# Patient Record
Sex: MALE | Race: OTHER | Employment: OTHER | URBAN - METROPOLITAN AREA
[De-identification: names, ages, dates, MRNs, and addresses within clinical notes are randomized per-mention and may not be internally consistent; named-entity substitution may affect disease eponyms.]

---

## 2019-08-15 NOTE — PATIENT DISCUSSION
Migraine Counseling:  I have discussed the diagnosis and the pathophysiology of these symptoms with the patient. It is important to reduce stress and pinpoint agents that precipitate the headache. Keeping a diary may help in identifying certain foods, alcohol, or other triggers. Referral to a neurologist may be necessary for medical management. If symptoms increase in frequency or severity, prophylactic medication may be considered. Return for follow-up as scheduled or sooner if symptoms worse. I discussed with the patient the pathophysiology and various clinical presentations of migraine. I discussed migraine therapy in general terms and suggested consultation with the primary care physician for specific treatment recommendations. I reassured the patient that these symptoms are unrelated to any problem related to eye health.

## 2020-08-17 NOTE — PATIENT DISCUSSION
MILD DRY EYE, OU: PRESCRIBED ARTIFICIAL TEARS PRN OU. RECOMMENDS OMEGA-3 FISH OIL WITH PRIMARY CARE PHYSICIANS APPROVAL. RETURN FOR FOLLOW-UP AS SCHEDULED OR SOONER IF SYMPTOMS WORSEN. I will START or STAY ON the medications listed below when I get home from the hospital:  None

## 2022-07-25 ENCOUNTER — NEW PATIENT (OUTPATIENT)
Dept: URBAN - METROPOLITAN AREA CLINIC 36 | Facility: CLINIC | Age: 72
End: 2022-07-25

## 2022-07-25 DIAGNOSIS — H35.3132: ICD-10-CM

## 2022-07-25 DIAGNOSIS — H25.813: ICD-10-CM

## 2022-07-25 DIAGNOSIS — H01.02A: ICD-10-CM

## 2022-07-25 DIAGNOSIS — H52.7: ICD-10-CM

## 2022-07-25 DIAGNOSIS — H01.02B: ICD-10-CM

## 2022-07-25 PROCEDURE — 92004 COMPRE OPH EXAM NEW PT 1/>: CPT

## 2022-07-25 PROCEDURE — 92015 DETERMINE REFRACTIVE STATE: CPT

## 2022-07-25 ASSESSMENT — VISUAL ACUITY
OD_SC: J1
OS_SC: J1
OD_CC: J3
OS_CC: J3
OS_SC: 20/200
OD_SC: 20/400
OS_CC: 20/40-1
OD_CC: 20/40-1

## 2022-07-25 ASSESSMENT — TONOMETRY
OD_IOP_MMHG: 12
OS_IOP_MMHG: 12

## 2022-10-14 NOTE — PATIENT DISCUSSION
AVT recommended.
Artificial tears OU PRN.
MF IOL not recommended.
No holes, tears or retinal detachments.
Patient reassured. No need for workup.
Retinal Tear and Detachment precautions reviewed and discussed with patient. Patient understands to return immediately for any concerning changes in vision.
Risks, benefits, limitations, and alternatives of cataract extraction discussed with patient, including but not limited to: bleeding, infection, acute or chronic intraocular inflammation, retinal hole/tear/detachment, increased or decreased intraocular pressure, macular edema, corneal edema, posterior capsule opacification, ptosis, irregular pupil, no improvement in vision, worsened vision, and the need for additional surgery. Patient understands the risks and wishes to proceed.
Stable.
The patient feels that the cataract is significantly impacting daily activities and has elected cataract surgery. The risks, benefits, and alternatives to surgery were discussed.  The patient elects to proceed with surgery OS first then OD if visual symptoms persist.
Pt A,A,Ox2

## 2022-10-25 NOTE — PATIENT DISCUSSION
I have discussed the options for refractive surgery to decrease dependency on glasses  and/or contact lenses after cataract surgery. These options include: correction of astigmatism with limbal relaxing  incision(s), LenSX arcuate incision(s), TORIC IOL, monovision, extended range of vision IOL, and multifocal IOL. It was  emphasized to the patient that the goal of reducing spectacle dependence not spectacle freedom is more realistic. The  patient understands it is possible they may still need a weak spectacle prescription to achieve their best vision. No visual outcome was guaranteed. The patient understands and desires to proceed with refractive cataract surgery.